# Patient Record
Sex: FEMALE | Race: BLACK OR AFRICAN AMERICAN | NOT HISPANIC OR LATINO | Employment: FULL TIME | URBAN - METROPOLITAN AREA
[De-identification: names, ages, dates, MRNs, and addresses within clinical notes are randomized per-mention and may not be internally consistent; named-entity substitution may affect disease eponyms.]

---

## 2024-08-28 ENCOUNTER — OFFICE VISIT (OUTPATIENT)
Dept: OBGYN CLINIC | Facility: CLINIC | Age: 24
End: 2024-08-28

## 2024-08-28 VITALS
HEART RATE: 86 BPM | SYSTOLIC BLOOD PRESSURE: 133 MMHG | DIASTOLIC BLOOD PRESSURE: 88 MMHG | HEIGHT: 63 IN | WEIGHT: 127 LBS | RESPIRATION RATE: 18 BRPM | BODY MASS INDEX: 22.5 KG/M2

## 2024-08-28 DIAGNOSIS — R82.90 ABNORMAL URINE ODOR: Primary | ICD-10-CM

## 2024-08-28 DIAGNOSIS — Z11.3 SCREENING FOR STD (SEXUALLY TRANSMITTED DISEASE): ICD-10-CM

## 2024-08-28 LAB
SL AMB  POCT GLUCOSE, UA: NORMAL
SL AMB LEUKOCYTE ESTERASE,UA: NORMAL
SL AMB POCT BILIRUBIN,UA: NORMAL
SL AMB POCT BLOOD,UA: NORMAL
SL AMB POCT CLARITY,UA: CLEAR
SL AMB POCT COLOR,UA: YELLOW
SL AMB POCT KETONES,UA: NORMAL
SL AMB POCT NITRITE,UA: NORMAL
SL AMB POCT PH,UA: 6
SL AMB POCT SPECIFIC GRAVITY,UA: 1.02
SL AMB POCT URINE PROTEIN: NORMAL
SL AMB POCT UROBILINOGEN: NORMAL

## 2024-08-28 PROCEDURE — 87491 CHLMYD TRACH DNA AMP PROBE: CPT | Performed by: OBSTETRICS & GYNECOLOGY

## 2024-08-28 PROCEDURE — 81003 URINALYSIS AUTO W/O SCOPE: CPT | Performed by: OBSTETRICS & GYNECOLOGY

## 2024-08-28 PROCEDURE — 87591 N.GONORRHOEAE DNA AMP PROB: CPT | Performed by: OBSTETRICS & GYNECOLOGY

## 2024-08-28 PROCEDURE — 99213 OFFICE O/P EST LOW 20 MIN: CPT | Performed by: OBSTETRICS & GYNECOLOGY

## 2024-08-28 RX ORDER — METRONIDAZOLE 500 MG/1
500 TABLET ORAL EVERY 12 HOURS SCHEDULED
Qty: 14 TABLET | Refills: 0 | Status: SHIPPED | OUTPATIENT
Start: 2024-08-28 | End: 2024-09-04

## 2024-08-28 NOTE — ASSESSMENT & PLAN NOTE
Pleasant 24 Year old  here for vaginal complaints.She complains of having foul odored discharge since 2 weeks  She denies any treatments tried. Denies recent antibiotic use. Denies douching. Denies fever, pelvic pain, burning urine, hematuria or dyspareunia. Denies new sexual partners.  She reports having a positive past history of HSV, Gonorrhea and Chlamydia  She is sexually active with 1 male partner since a long time  Her periods are normal, she does not use any birth control  Her last treatment for BV was a year ago, she states her foul odor increases after sexual activity  On exam, think discharge visible, with a foul odor. Other Exam WNL.    Plan:  Wet mount: positive whiff test. STD testing done.  POCT urine dip WNL  Order Falgyl 500mg q 12 hourly for 7 days  Discussed in detail about vaginal hygiene  Avoid sexual activity and use of soaps, tampons, contraceptive devices such as condoms, fastidious cleaning, tight clothing while being on treatment  Follow up as needed

## 2024-08-28 NOTE — PROGRESS NOTES
Ambulatory Visit  Name: Lilian Moore      : 2000      MRN: [id number]  Encounter Provider: Resident PA Abreu  Encounter Date: 2024   [facility]    Assessment & Plan   1. Abnormal vaginal discharge odor  Assessment & Plan:  Pleasant 24 Year old  here for vaginal complaints.She complains of having foul odored discharge since 2 weeks  She denies any treatments tried. Denies recent antibiotic use. Denies douching. Denies fever, pelvic pain, burning urine, hematuria or dyspareunia. Denies new sexual partners.  She reports having a positive past history of HSV, Gonorrhea and Chlamydia  She is sexually active with 1 male partner since a long time  Her periods are normal, she does not use any birth control  Her last treatment for BV was a year ago, she states her foul odor increases after sexual activity  On exam, think discharge visible, with a foul odor. Other Exam WNL.    Plan:  Wet mount: positive whiff test. STD testing done.  POCT urine dip WNL  Order Falgyl 500mg q 12 hourly for 7 days  Discussed in detail about vaginal hygiene  Avoid sexual activity and use of soaps, tampons, contraceptive devices such as condoms, fastidious cleaning, tight clothing while being on treatment  Follow up as needed  Orders:  -     POCT urine dip auto non-scope  -     metroNIDAZOLE (FLAGYL) 500 mg tablet; Take 1 tablet (500 mg total) by mouth every 12 (twelve) hours for 7 days  2. Screening for STD (sexually transmitted disease)  -     Chlamydia/GC amplified DNA by PCR      History of Present Illness     Lilian Moore is a 24 y.o. female who presents to the office with complaints of vaginal discharge since 2 weeks.    Review of Systems   Constitutional:  Negative for chills and fever.   HENT:  Negative for ear pain and sore throat.    Eyes:  Negative for pain and visual disturbance.   Respiratory:  Negative for cough and shortness of breath.   "  Cardiovascular:  Negative for chest pain and palpitations.   Gastrointestinal:  Negative for abdominal pain and vomiting.   Genitourinary:  Negative for dysuria and hematuria.        Vaginal discharge since 2 weeks   Musculoskeletal:  Negative for arthralgias and back pain.   Skin:  Negative for color change and rash.   Neurological:  Negative for seizures and syncope.   All other systems reviewed and are negative.      Objective     /88 (BP Location: Right arm, Patient Position: Sitting, Cuff Size: Adult)   Pulse 86   Resp 18   Ht 5' 3\" (1.6 m)   Wt 57.6 kg (127 lb)   LMP 08/20/2024 (Approximate)   BMI 22.50 kg/m²     Physical Exam  Vitals and nursing note reviewed.   Constitutional:       General: She is not in acute distress.     Appearance: She is well-developed.   HENT:      Head: Normocephalic and atraumatic.   Eyes:      Conjunctiva/sclera: Conjunctivae normal.   Cardiovascular:      Rate and Rhythm: Normal rate and regular rhythm.      Heart sounds: No murmur heard.  Pulmonary:      Effort: Pulmonary effort is normal. No respiratory distress.      Breath sounds: Normal breath sounds.   Abdominal:      Palpations: Abdomen is soft.      Tenderness: There is no abdominal tenderness.   Genitourinary:     General: Normal vulva.      Vagina: Vaginal discharge present.   Musculoskeletal:         General: No swelling.      Cervical back: Neck supple.   Skin:     General: Skin is warm and dry.      Capillary Refill: Capillary refill takes less than 2 seconds.   Neurological:      Mental Status: She is alert and oriented to person, place, and time.   Psychiatric:         Mood and Affect: Mood normal.       Adam Rodrigues MD  PGY2 Family Medicine Residency Program  [facility]       "

## 2024-08-29 LAB
C TRACH DNA SPEC QL NAA+PROBE: NEGATIVE
N GONORRHOEA DNA SPEC QL NAA+PROBE: NEGATIVE

## 2024-10-16 ENCOUNTER — HOSPITAL ENCOUNTER (EMERGENCY)
Facility: HOSPITAL | Age: 24
Discharge: HOME | End: 2024-10-16
Attending: EMERGENCY MEDICINE
Payer: COMMERCIAL

## 2024-10-16 VITALS
HEART RATE: 74 BPM | SYSTOLIC BLOOD PRESSURE: 135 MMHG | TEMPERATURE: 99.2 F | DIASTOLIC BLOOD PRESSURE: 78 MMHG | OXYGEN SATURATION: 99 % | RESPIRATION RATE: 19 BRPM

## 2024-10-16 DIAGNOSIS — J02.9 SORE THROAT: ICD-10-CM

## 2024-10-16 DIAGNOSIS — J06.9 VIRAL URI WITH COUGH: Primary | ICD-10-CM

## 2024-10-16 LAB
FLUAV AG UPPER RESP QL IA.RAPID: NEGATIVE
FLUBV AG UPPER RESP QL IA.RAPID: NEGATIVE
S PYO DNA THROAT QL NAA+PROBE: NOT DETECTED
SARS-COV+SARS-COV-2 AG RESP QL IA.RAPID: NEGATIVE

## 2024-10-16 PROCEDURE — 99284 EMERGENCY DEPT VISIT MOD MDM: CPT | Performed by: PHYSICIAN ASSISTANT

## 2024-10-16 PROCEDURE — 87651 STREP A DNA AMP PROBE: CPT | Performed by: PHYSICIAN ASSISTANT

## 2024-10-16 PROCEDURE — 87811 SARS-COV-2 COVID19 W/OPTIC: CPT | Performed by: PHYSICIAN ASSISTANT

## 2024-10-16 PROCEDURE — 99283 EMERGENCY DEPT VISIT LOW MDM: CPT

## 2024-10-16 PROCEDURE — 87804 INFLUENZA ASSAY W/OPTIC: CPT | Performed by: PHYSICIAN ASSISTANT

## 2024-10-16 RX ORDER — CROMOLYN SODIUM 5.2 MG
1 AEROSOL, SPRAY WITH PUMP (ML) NASAL 4 TIMES DAILY PRN
Qty: 413 ML | Refills: 0 | Status: SHIPPED | OUTPATIENT
Start: 2024-10-16

## 2024-10-16 NOTE — ED PROVIDER NOTES
"Time reflects when diagnosis was documented in both MDM as applicable and the Disposition within this note       Time User Action Codes Description Comment    10/16/2024 11:17 AM Justin Melissa Add [J06.9] Viral URI with cough     10/16/2024 11:17 AM Justin Melissa Add [J02.9] Sore throat           ED Disposition       ED Disposition   Discharge    Condition   Stable    Date/Time   Wed Oct 16, 2024 11:17 AM    Comment   Lilian Moore discharge to home/self care.                   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  Sent in by facility for COVID testing.  It hurts swallow but she is able she has a normal voice she is nontoxic no acute distress differential diagnose includes not limited to COVID, flu, strep, viral syndrome.    Amount and/or Complexity of Data Reviewed  Labs: ordered.             Medications - No data to display    ED Risk Strat Scores                           SBIRT 20yo+      Flowsheet Row Most Recent Value   Initial Alcohol Screen: US AUDIT-C     1. How often do you have a drink containing alcohol? 0 Filed at: 10/16/2024 1111   2. How many drinks containing alcohol do you have on a typical day you are drinking?  0 Filed at: 10/16/2024 1111   3a. Male UNDER 65: How often do you have five or more drinks on one occasion? 0 Filed at: 10/16/2024 1111   3b. FEMALE Any Age, or MALE 65+: How often do you have 4 or more drinks on one occassion? 0 Filed at: 10/16/2024 1111   Audit-C Score 0 Filed at: 10/16/2024 1111   RENE: How many times in the past year have you...    Used an illegal drug or used a prescription medication for non-medical reasons? Never Filed at: 10/16/2024 1111                            History of Present Illness       Chief Complaint   Patient presents with    Cold Like Symptoms     Pt. 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 Pt. Reports no body aches or chills.        Past Medical History:   Diagnosis Date    Acne     Allergic     Asthma     Migraines       Past Surgical History:   Procedure Laterality Date    INDUCED         Family History   Problem Relation Age of Onset    Depression Mother     Anxiety disorder Mother     Bipolar disorder Mother     Diabetes Mother     Hypertension Mother     Hypertension Father     Sleep apnea Father     Pancreatic cancer Maternal Grandmother     Diabetes Maternal Grandmother     Hypertension Maternal Grandmother     Diabetes Maternal Grandfather     Pancreatic cancer Maternal Grandfather     Diabetes Paternal Grandmother     Diabetes Paternal Grandfather     Diabetes Maternal Aunt     Diabetes Maternal Uncle     Diabetes Paternal Aunt     Diabetes Paternal Uncle     No Known Problems Half-Sister     No Known Problems Half-Sister     No Known Problems Half-Sister     No Known Problems Half-Sister     No Known Problems Half-Brother     No Known Problems Half-Brother       Social History     Tobacco Use    Smoking status: Former     Current packs/day: 0.00     Types: Cigarettes     Quit date:      Years since quittin.7    Smokeless tobacco: Never    Tobacco comments:     quit    Vaping Use    Vaping status: Never Used   Substance Use Topics    Alcohol use: Not Currently     Comment: social    Drug use: Not Currently     Frequency: 3.0 times per week     Types: Marijuana      E-Cigarette/Vaping    E-Cigarette Use Never User       E-Cigarette/Vaping Substances    Nicotine No     THC No     CBD No     Flavoring No     Other No     Unknown No       I have reviewed and agree with the history as documented.     This is a 24-year-old female patient started 3 days ago with nasal congestion and mild cough and a sore throat able to drink eat has a normal voice no stiff neck.  Patient reports no " chills no fevers no bodyaches.  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  No headache no blurred vision no double vision no shortness of breath no chest pain no nausea vomiting diarrhea abdominal pain she taken over-the-counter medication with to help the symptoms but does not overall control the problem.  She is nontoxic no acute distress        Review of Systems   Constitutional:  Negative for diaphoresis, fatigue and fever.   HENT:  Positive for congestion, rhinorrhea and sore throat. Negative for dental problem, drooling, ear discharge, ear pain, facial swelling, mouth sores, nosebleeds, postnasal drip, sinus pressure, sinus pain, sneezing, tinnitus, trouble swallowing and voice change.    Eyes:  Negative for photophobia, pain, discharge and visual disturbance.   Respiratory:  Positive for cough. Negative for choking, chest tightness, shortness of breath and wheezing.    Cardiovascular:  Negative for chest pain and palpitations.   Gastrointestinal:  Negative for abdominal distention, abdominal pain, diarrhea and vomiting.   Genitourinary:  Negative for dysuria, flank pain and frequency.   Musculoskeletal:  Negative for back pain, gait problem and joint swelling.   Skin:  Negative for color change and rash.   Neurological:  Negative for dizziness, syncope and headaches.   Psychiatric/Behavioral:  Negative for behavioral problems and confusion. The patient is not nervous/anxious.    All other systems reviewed and are negative.          Objective       ED Triage Vitals [10/16/24 1110]   Temperature Pulse Blood Pressure Respirations SpO2 Patient Position - Orthostatic VS   99.2 °F (37.3 °C) 74 135/78 19 99 % Sitting      Temp Source Heart Rate Source BP Location FiO2 (%) Pain Score    Oral Monitor Right arm -- No Pain      Vitals      Date and Time Temp Pulse SpO2 Resp BP Pain Score FACES Pain Rating User   10/16/24 1110 99.2 °F (37.3 °C) 74 99 % 19 135/78 No  Pain -- DD            Physical Exam  Vitals and nursing note reviewed.   Constitutional:       General: She is not in acute distress.     Appearance: Normal appearance. She is not ill-appearing, toxic-appearing or diaphoretic.   HENT:      Head: Normocephalic and atraumatic.      Right Ear: Tympanic membrane, ear canal and external ear normal.      Left Ear: Tympanic membrane, ear canal and external ear normal.      Nose: Nose normal. No congestion or rhinorrhea.      Mouth/Throat:      Mouth: Mucous membranes are dry.      Pharynx: Oropharynx is clear. No oropharyngeal exudate or posterior oropharyngeal erythema.   Eyes:      Extraocular Movements: Extraocular movements intact.      Conjunctiva/sclera: Conjunctivae normal.      Pupils: Pupils are equal, round, and reactive to light.   Cardiovascular:      Rate and Rhythm: Normal rate and regular rhythm.   Pulmonary:      Effort: Pulmonary effort is normal. No respiratory distress.      Breath sounds: Normal breath sounds.   Abdominal:      General: Bowel sounds are normal.      Palpations: Abdomen is soft.      Tenderness: There is no abdominal tenderness.   Musculoskeletal:         General: Normal range of motion.      Cervical back: Normal range of motion and neck supple. No rigidity or tenderness.      Right lower leg: No edema.      Left lower leg: No edema.   Lymphadenopathy:      Cervical: No cervical adenopathy.   Skin:     General: Skin is warm and dry.      Capillary Refill: Capillary refill takes less than 2 seconds.      Findings: No rash.   Neurological:      General: No focal deficit present.      Mental Status: She is alert and oriented to person, place, and time. Mental status is at baseline.   Psychiatric:         Mood and Affect: Mood normal.         Behavior: Behavior normal.         Results Reviewed       Procedure Component Value Units Date/Time    FLU/COVID Rapid Antigen (30 min. TAT) - Preferred screening test in ED [[id number]] Collected:  10/16/24 1114    Lab Status: In process Specimen: Nares from Nose Updated: 10/16/24 1117    Strep A PCR [[id number]] Collected: 10/16/24 1114    Lab Status: In process Specimen: Throat Updated: 10/16/24 1117            No orders to display       Procedures    ED Medication and Procedure Management   Prior to Admission Medications   Prescriptions Last Dose Informant Patient Reported? Taking?   3 Day Vaginal 2 % vaginal cream  Self Yes No   Sig: insert 1 applicatorful vaginally at bedtime for 3 days   Patient not taking: Reported on 2023   Biotin 1 MG CAPS  Self Yes No   Sig: Take by mouth   Patient not taking: Reported on 2022   Cholecalciferol 71213 units capsule  Self No No   Sig: Take 1 capsule (50,000 Units total) by mouth once a week x12wks and then to take OTC Vit D 2000IU QD   Patient not taking: Reported on 2020   Prenatal MV-Min-Fe Fum-FA-DHA (PRENATAL 1 PO)  Self Yes No   Sig: Take by mouth   Patient not taking: Reported on 2024   acetaminophen (TYLENOL) 500 mg tablet  Self Yes No   Sig: Take 500 mg by mouth every 6 (six) hours as needed for mild pain   Patient not taking: Reported on 2023   aspirin (ECOTRIN LOW STRENGTH) 81 mg EC tablet   No No   Sig: Take 2 tablets (162 mg total) by mouth daily Stop at 36 weeks.   Patient not taking: Reported on 2023   clindamycin (CLEOCIN T) 1 % lotion  Self Yes No   Sig: APPLY TO ACNE AREAS ON FACE TWICE A DAY   Patient not taking: Reported on 2022   ferrous sulfate 325 (65 Fe) mg tablet  Self Yes No   Sig: Take 325 mg by mouth daily with breakfast   fluconazole (DIFLUCAN) 100 mg tablet  Self Yes No   Sig: Take 100 mg by mouth if needed   Patient not taking: Reported on 2023   fluticasone (FLONASE) 50 mcg/act nasal spray  Self No No   Si spray into each nostril daily   Patient not taking: Reported on 2022   triamcinolone (KENALOG) 0.1 % cream  Self Yes No   Sig: APPLY A SMALL AMOUNT TO RASH ON FACE TWICE DAILY FOR UP TO 2  WEEKS THEN AS NEEDED FOR FLARE UPS   Patient not taking: Reported on 8/17/2022   valACYclovir (VALTREX) 1,000 mg tablet   No No   Sig: Take 1 tablet (1,000 mg total) by mouth 2 (two) times a day for 10 days   Patient not taking: Reported on 12/20/2022      Facility-Administered Medications: None     Patient's Medications   Discharge Prescriptions    CROMOLYN (NASALCHROM) 5.2 MG/ACT NASAL SPRAY    1 spray into each nostril 4 (four) times a day as needed for rhinitis       Start Date: 10/16/2024End Date: --       Order Dose: 1 spray       Quantity: 413 mL    Refills: 0     No discharge procedures on file.  ED SEPSIS DOCUMENTATION   Time reflects when diagnosis was documented in both MDM as applicable and the Disposition within this note       Time User Action Codes Description Comment    10/16/2024 11:17 AM Justin Raygoza [J06.9] Viral URI with cough     10/16/2024 11:17 AM uJstin Raygoza [J02.9] Sore throat                  Justin Raygoza PA-C  10/16/24 1123

## 2024-10-16 NOTE — Clinical Note
Lilian Moore was seen and treated in our emergency department on 10/16/2024.                Diagnosis: Viral symptoms    Lilian  .    She may return on this date: 10/17/2024    You may return tomorrow if your COVID and flu are negative.  If your COVID is positive please follow the CDC protocol/and/or your work protocol for return     If you have any questions or concerns, please don't hesitate to call.      Justin Raygoza PA-C    ______________________________           _______________          _______________  Hospital Representative                              Date                                Time

## 2024-10-16 NOTE — DISCHARGE INSTRUCTIONS
Return with any worsening symptoms questions comments or concerns    Follow-up with your family doctor for ongoing care on re-evaluation    We will call you if any of your testing today is positive you can also look on [facility]. PHILLIP

## 2024-12-20 ENCOUNTER — HOSPITAL ENCOUNTER (EMERGENCY)
Facility: HOSPITAL | Age: 24
Discharge: HOME | End: 2024-12-20
Attending: EMERGENCY MEDICINE
Payer: COMMERCIAL

## 2024-12-20 ENCOUNTER — APPOINTMENT (EMERGENCY)
Dept: RADIOLOGY | Facility: HOSPITAL | Age: 24
End: 2024-12-20
Payer: COMMERCIAL

## 2024-12-20 VITALS
WEIGHT: 130 LBS | HEIGHT: 63 IN | SYSTOLIC BLOOD PRESSURE: 137 MMHG | BODY MASS INDEX: 23.04 KG/M2 | TEMPERATURE: 98.4 F | DIASTOLIC BLOOD PRESSURE: 88 MMHG | OXYGEN SATURATION: 100 % | HEART RATE: 95 BPM | RESPIRATION RATE: 18 BRPM

## 2024-12-20 DIAGNOSIS — B34.9 VIRAL SYNDROME: Primary | ICD-10-CM

## 2024-12-20 LAB
EXT PREGNANCY TEST URINE: NEGATIVE
EXT. CONTROL: NORMAL
FLUAV AG UPPER RESP QL IA.RAPID: NEGATIVE
FLUBV AG UPPER RESP QL IA.RAPID: NEGATIVE
S PYO DNA THROAT QL NAA+PROBE: NOT DETECTED
SARS-COV+SARS-COV-2 AG RESP QL IA.RAPID: NEGATIVE

## 2024-12-20 PROCEDURE — 81025 URINE PREGNANCY TEST: CPT | Performed by: EMERGENCY MEDICINE

## 2024-12-20 PROCEDURE — 87811 SARS-COV-2 COVID19 W/OPTIC: CPT | Performed by: EMERGENCY MEDICINE

## 2024-12-20 PROCEDURE — 71046 X-RAY EXAM CHEST 2 VIEWS: CPT

## 2024-12-20 PROCEDURE — 99284 EMERGENCY DEPT VISIT MOD MDM: CPT | Performed by: EMERGENCY MEDICINE

## 2024-12-20 PROCEDURE — 87651 STREP A DNA AMP PROBE: CPT | Performed by: EMERGENCY MEDICINE

## 2024-12-20 PROCEDURE — 99283 EMERGENCY DEPT VISIT LOW MDM: CPT

## 2024-12-20 PROCEDURE — 87804 INFLUENZA ASSAY W/OPTIC: CPT | Performed by: EMERGENCY MEDICINE

## 2024-12-20 RX ORDER — ACETAMINOPHEN 325 MG/1
975 TABLET ORAL ONCE
Status: COMPLETED | OUTPATIENT
Start: 2024-12-20 | End: 2024-12-20

## 2024-12-20 RX ORDER — IBUPROFEN 400 MG/1
400 TABLET, FILM COATED ORAL ONCE
Status: COMPLETED | OUTPATIENT
Start: 2024-12-20 | End: 2024-12-20

## 2024-12-20 RX ORDER — FLUTICASONE PROPIONATE 50 MCG
1 SPRAY, SUSPENSION (ML) NASAL DAILY
Status: DISCONTINUED | OUTPATIENT
Start: 2024-12-20 | End: 2024-12-20 | Stop reason: HOSPADM

## 2024-12-20 RX ADMIN — FLUTICASONE PROPIONATE 1 SPRAY: 50 SPRAY, METERED NASAL at 09:43

## 2024-12-20 RX ADMIN — ACETAMINOPHEN 975 MG: 325 TABLET ORAL at 09:42

## 2024-12-20 RX ADMIN — IBUPROFEN 400 MG: 400 TABLET, FILM COATED ORAL at 09:42

## 2024-12-20 NOTE — ED PROVIDER NOTES
Time reflects when diagnosis was documented in both MDM as applicable and the Disposition within this note       Time User Action Codes Description Comment    12/20/2024 10:27 AM Anne-Marie Hussein Add [B34.9] Viral syndrome           ED Disposition       ED Disposition   Discharge    Condition   Stable    Date/Time   Fri Dec 20, 2024 10:27 AM    Comment   Lilian Moore discharge to home/self care.                   Assessment & Plan       Medical Decision Making  Pt is a 25yo F who presents with cough and myalgias. Exam pertinent for well-appearing patient.    Differential diagnosis to include but not limited to viral syndrome, pharyngitis, pneumonia.  Will plan for viral swab, strep swab, chest x-ray, and pregnancy test.  See ED course for results and details.    Plan to discharge pt with f/u to PCP. Discussed returning the ED with new or worsening of symptoms. Discussed use of over the counter medications as stated on the bottle as needed for symptoms. Pt expressed understanding of discharge instructions, return precautions, and medication instructions and is stable for discharge at this time. All questions were answered and pt was discharged without incident.         Amount and/or Complexity of Data Reviewed  Labs: ordered. Decision-making details documented in ED Course.  Radiology: ordered. Decision-making details documented in ED Course.    Risk  OTC drugs.  Prescription drug management.        ED Course as of 12/20/24 1032   Fri Dec 20, 2024   0953 PREGNANCY TEST URINE: Negative  Negative.    1014 FLU/COVID Rapid Antigen (30 min. TAT) - Preferred screening test in ED  Negative.    1014 XR chest 2 views  No acute findings on wet read.    1015 Pulse: 95  Interval improvement.    1017 STREP A PCR: Not Detected  Negative.        Medications   fluticasone (FLONASE) 50 mcg/act nasal spray 1 spray (1 spray Each Nare Given 12/20/24 0943)   acetaminophen (TYLENOL) tablet 975 mg (975 mg Oral Given 12/20/24 0942)    ibuprofen (MOTRIN) tablet 400 mg (400 mg Oral Given 24 0942)       ED Risk Strat Scores                          SBIRT 20yo+      Flowsheet Row Most Recent Value   Initial Alcohol Screen: US AUDIT-C     1. How often do you have a drink containing alcohol? 0 Filed at: 2024   2. How many drinks containing alcohol do you have on a typical day you are drinking?  0 Filed at: 2024   3a. Male UNDER 65: How often do you have five or more drinks on one occasion? 0 Filed at: 2024   3b. FEMALE Any Age, or MALE 65+: How often do you have 4 or more drinks on one occassion? 0 Filed at: 2024   Audit-C Score 0 Filed at: 2024   RENE: How many times in the past year have you...    Used an illegal drug or used a prescription medication for non-medical reasons? Never Filed at: 2024                            History of Present Illness       Chief Complaint   Patient presents with    Flu Symptoms     Congestion, chills, body aches, HA, sore throat for 3 days       Past Medical History:   Diagnosis Date    Acne     Allergic     Asthma     Migraines       Past Surgical History:   Procedure Laterality Date    INDUCED         Family History   Problem Relation Age of Onset    Depression Mother     Anxiety disorder Mother     Bipolar disorder Mother     Diabetes Mother     Hypertension Mother     Hypertension Father     Sleep apnea Father     Pancreatic cancer Maternal Grandmother     Diabetes Maternal Grandmother     Hypertension Maternal Grandmother     Diabetes Maternal Grandfather     Pancreatic cancer Maternal Grandfather     Diabetes Paternal Grandmother     Diabetes Paternal Grandfather     Diabetes Maternal Aunt     Diabetes Maternal Uncle     Diabetes Paternal Aunt     Diabetes Paternal Uncle     No Known Problems Half-Sister     No Known Problems Half-Sister     No Known Problems Half-Sister     No Known Problems Half-Sister     No Known Problems  Half-Brother     No Known Problems Half-Brother       Social History     Tobacco Use    Smoking status: Former     Current packs/day: 0.00     Types: Cigarettes     Quit date:      Years since quittin.9    Smokeless tobacco: Never    Tobacco comments:     quit 2018   Vaping Use    Vaping status: Never Used   Substance Use Topics    Alcohol use: Not Currently     Comment: social    Drug use: Not Currently     Frequency: 3.0 times per week     Types: Marijuana      E-Cigarette/Vaping    E-Cigarette Use Never User       E-Cigarette/Vaping Substances    Nicotine No     THC No     CBD No     Flavoring No     Other No     Unknown No       I have reviewed and agree with the history as documented.     Pt is a 25yo F who presents for sore throat and myalgias.  Patient reports her symptoms started 3 days ago.  Patient reports diffuse myalgias.  She reports bilateral sore throat.  She states it is painful to swallow but she has been able to tolerate p.o.  Patient also reports that she feels dehydrated.  Patient also reporting a cough that is productive.  She states that she has not checked her temperature and is unsure if she has had a fever.  Patient reports that her daughter is in  and therefore often ill but denies any other sick contacts.  Patient reports she is otherwise healthy.  Patient states she does not believe she is pregnant.  Patient reports she has been intermittently using Mucinex and Tylenol for symptoms.            Objective       ED Triage Vitals [24]   Temperature Pulse Blood Pressure Respirations SpO2 Patient Position - Orthostatic VS   98.4 °F (36.9 °C) (!) 135 149/95 18 98 % --      Temp src Heart Rate Source BP Location FiO2 (%) Pain Score    -- -- -- -- 5      Vitals      Date and Time Temp Pulse SpO2 Resp BP Pain Score FACES Pain Rating User   24 0945 -- 95 100 % 18 137/88 -- -- AYAZ   24 0942 -- -- -- -- -- 4 -- AYAZ   24 98.4 °F (36.9 °C) 135 98 % 18  149/95 5 -- YAAZ            Physical Exam  Vitals reviewed.   Constitutional:       General: She is not in acute distress.     Appearance: She is well-developed. She is not toxic-appearing or diaphoretic.   HENT:      Head: Normocephalic and atraumatic.      Right Ear: External ear normal.      Left Ear: External ear normal.      Nose: Nose normal.      Mouth/Throat:      Pharynx: Oropharynx is clear.   Eyes:      Extraocular Movements: Extraocular movements intact.      Conjunctiva/sclera: Conjunctivae normal.      Pupils: Pupils are equal, round, and reactive to light.   Cardiovascular:      Rate and Rhythm: Regular rhythm. Tachycardia present.      Heart sounds: Normal heart sounds.   Pulmonary:      Effort: Pulmonary effort is normal. No respiratory distress.      Breath sounds: Normal breath sounds. No wheezing.   Abdominal:      General: There is no distension.      Palpations: Abdomen is soft.      Tenderness: There is no abdominal tenderness.   Musculoskeletal:         General: Normal range of motion.      Cervical back: Normal range of motion and neck supple.      Right lower leg: No edema.      Left lower leg: No edema.   Skin:     General: Skin is warm and dry.      Capillary Refill: Capillary refill takes less than 2 seconds.      Coloration: Skin is not pale.      Findings: No erythema or rash.   Neurological:      General: No focal deficit present.      Mental Status: She is alert and oriented to person, place, and time.   Psychiatric:         Speech: Speech normal.         Behavior: Behavior is cooperative.         Results Reviewed       Procedure Component Value Units Date/Time    Strep A PCR [[id number]]  (Normal) Collected: 12/20/24 0942    Lab Status: Final result Specimen: Throat Updated: 12/20/24 1016     STREP A PCR Not Detected    FLU/COVID Rapid Antigen (30 min. TAT) - Preferred screening test in ED [[id number]]  (Normal) Collected: 12/20/24 0942    Lab Status: Final result Specimen: Nares from  Nose Updated: 12/20/24 1011     SARS COV Rapid Antigen Negative     Influenza A Rapid Antigen Negative     Influenza B Rapid Antigen Negative    Narrative:      This test has been performed using the Quidel Monserrat 2 FLU+SARS Antigen test under the Emergency Use Authorization (EUA). This test has been validated by the  and verified by the performing laboratory. The Monserrat uses lateral flow immunofluorescent sandwich assay to detect SARS-COV, Influenza A and Influenza B Antigen.     The Quidel Monserrat 2 SARS Antigen test does not differentiate between SARS-CoV and SARS-CoV-2.     Negative results are presumptive and may be confirmed with a molecular assay, if necessary, for patient management. Negative results do not rule out SARS-CoV-2 or influenza infection and should not be used as the sole basis for treatment or patient management decisions. A negative test result may occur if the level of antigen in a sample is below the limit of detection of this test.     Positive results are indicative of the presence of viral antigens, but do not rule out bacterial infection or co-infection with other viruses.     All test results should be used as an adjunct to clinical observations and other information available to the provider.    FOR PEDIATRIC PATIENTS - copy/paste COVID Guidelines URL to browser: [email or web address]    POCT pregnancy, urine [[id number]]  (Normal) Collected: 12/20/24 0952    Lab Status: Final result Updated: 12/20/24 0952     EXT Preg Test, Ur Negative     Control Valid            XR chest 2 views    (Results Pending)       Procedures    ED Medication and Procedure Management   Prior to Admission Medications   Prescriptions Last Dose Informant Patient Reported? Taking?   3 Day Vaginal 2 % vaginal cream  Self Yes No   Sig: insert 1 applicatorful vaginally at bedtime for 3 days   Patient not taking: Reported on 2/9/2023   Biotin 1 MG CAPS  Self  Yes No   Sig: Take by mouth   Patient not taking: Reported on 2022   Cholecalciferol 96592 units capsule  Self No No   Sig: Take 1 capsule (50,000 Units total) by mouth once a week x12wks and then to take OTC Vit D 2000IU QD   Patient not taking: Reported on 2020   Prenatal MV-Min-Fe Fum-FA-DHA (PRENATAL 1 PO)  Self Yes No   Sig: Take by mouth   Patient not taking: Reported on 2024   acetaminophen (TYLENOL) 500 mg tablet  Self Yes No   Sig: Take 500 mg by mouth every 6 (six) hours as needed for mild pain   Patient not taking: Reported on 2023   aspirin (ECOTRIN LOW STRENGTH) 81 mg EC tablet   No No   Sig: Take 2 tablets (162 mg total) by mouth daily Stop at 36 weeks.   Patient not taking: Reported on 2023   clindamycin (CLEOCIN T) 1 % lotion  Self Yes No   Sig: APPLY TO ACNE AREAS ON FACE TWICE A DAY   Patient not taking: Reported on 2022   cromolyn (NASALCHROM) 5.2 MG/ACT nasal spray   No No   Si spray into each nostril 4 (four) times a day as needed for rhinitis   ferrous sulfate 325 (65 Fe) mg tablet  Self Yes No   Sig: Take 325 mg by mouth daily with breakfast   fluconazole (DIFLUCAN) 100 mg tablet  Self Yes No   Sig: Take 100 mg by mouth if needed   Patient not taking: Reported on 2023   fluticasone (FLONASE) 50 mcg/act nasal spray  Self No No   Si spray into each nostril daily   Patient not taking: Reported on 2022   triamcinolone (KENALOG) 0.1 % cream  Self Yes No   Sig: APPLY A SMALL AMOUNT TO RASH ON FACE TWICE DAILY FOR UP TO 2 WEEKS THEN AS NEEDED FOR FLARE UPS   Patient not taking: Reported on 2022   valACYclovir (VALTREX) 1,000 mg tablet   No No   Sig: Take 1 tablet (1,000 mg total) by mouth 2 (two) times a day for 10 days   Patient not taking: Reported on 2022      Facility-Administered Medications: None     Patient's Medications   Discharge Prescriptions    No medications on file     No discharge procedures on file.  ED SEPSIS DOCUMENTATION    Time reflects when diagnosis was documented in both MDM as applicable and the Disposition within this note       Time User Action Codes Description Comment    12/20/2024 10:27 AM Anne-Marie Hussein Add [B34.9] Viral syndrome                  Anne-Marie Hussein MD  12/20/24 1032

## 2024-12-20 NOTE — DISCHARGE INSTRUCTIONS
Follow-up with primary care for further care. Contact info provided below if needed.  Use over the counter medications as stated on the bottle as needed for symptom control.  Return to the ED with new or worsening symptoms.

## 2025-01-11 ENCOUNTER — HOSPITAL ENCOUNTER (EMERGENCY)
Facility: HOSPITAL | Age: 25
Discharge: HOME | End: 2025-01-11
Attending: EMERGENCY MEDICINE
Payer: COMMERCIAL

## 2025-01-11 VITALS
DIASTOLIC BLOOD PRESSURE: 75 MMHG | RESPIRATION RATE: 16 BRPM | TEMPERATURE: 99.5 F | HEART RATE: 90 BPM | SYSTOLIC BLOOD PRESSURE: 128 MMHG

## 2025-01-11 DIAGNOSIS — N76.0 BV (BACTERIAL VAGINOSIS): Primary | ICD-10-CM

## 2025-01-11 DIAGNOSIS — B96.89 BV (BACTERIAL VAGINOSIS): Primary | ICD-10-CM

## 2025-01-11 LAB
BACTERIA UR QL AUTO: NORMAL /HPF
BILIRUB UR QL STRIP: NEGATIVE
CLARITY UR: CLEAR
COLOR UR: COLORLESS
EXT PREGNANCY TEST URINE: NEGATIVE
EXT. CONTROL: NORMAL
GLUCOSE UR STRIP-MCNC: NEGATIVE MG/DL
HGB UR QL STRIP.AUTO: NEGATIVE
KETONES UR STRIP-MCNC: NEGATIVE MG/DL
LEUKOCYTE ESTERASE UR QL STRIP: ABNORMAL
NITRITE UR QL STRIP: NEGATIVE
NON-SQ EPI CELLS URNS QL MICRO: NORMAL /HPF
PH UR STRIP.AUTO: 6 [PH]
PROT UR STRIP-MCNC: NEGATIVE MG/DL
RBC #/AREA URNS AUTO: NORMAL /HPF
SP GR UR STRIP.AUTO: <1.005 (ref 1–1.03)
UROBILINOGEN UR STRIP-ACNC: <2 MG/DL
WBC #/AREA URNS AUTO: NORMAL /HPF

## 2025-01-11 PROCEDURE — 87591 N.GONORRHOEAE DNA AMP PROB: CPT | Performed by: EMERGENCY MEDICINE

## 2025-01-11 PROCEDURE — 81001 URINALYSIS AUTO W/SCOPE: CPT | Performed by: EMERGENCY MEDICINE

## 2025-01-11 PROCEDURE — 99283 EMERGENCY DEPT VISIT LOW MDM: CPT

## 2025-01-11 PROCEDURE — 81025 URINE PREGNANCY TEST: CPT | Performed by: EMERGENCY MEDICINE

## 2025-01-11 PROCEDURE — 87563 M. GENITALIUM AMP PROBE: CPT | Performed by: EMERGENCY MEDICINE

## 2025-01-11 PROCEDURE — 87491 CHLMYD TRACH DNA AMP PROBE: CPT | Performed by: EMERGENCY MEDICINE

## 2025-01-11 PROCEDURE — 99284 EMERGENCY DEPT VISIT MOD MDM: CPT | Performed by: EMERGENCY MEDICINE

## 2025-01-11 PROCEDURE — 87661 TRICHOMONAS VAGINALIS AMPLIF: CPT | Performed by: EMERGENCY MEDICINE

## 2025-01-11 RX ORDER — METRONIDAZOLE 500 MG/1
500 TABLET ORAL 2 TIMES DAILY
Qty: 14 TABLET | Refills: 0 | Status: SHIPPED | OUTPATIENT
Start: 2025-01-11 | End: 2025-01-18

## 2025-01-11 RX ORDER — METRONIDAZOLE 500 MG/1
500 TABLET ORAL ONCE
Status: COMPLETED | OUTPATIENT
Start: 2025-01-11 | End: 2025-01-11

## 2025-01-11 RX ADMIN — METRONIDAZOLE 500 MG: 500 TABLET ORAL at 16:56

## 2025-01-11 NOTE — ED PROVIDER NOTES
Time reflects when diagnosis was documented in both MDM as applicable and the Disposition within this note       Time User Action Codes Description Comment    2025  4:48 PM Cheo Johnston Add [N76.0,  B96.89] BV (bacterial vaginosis)           ED Disposition       ED Disposition   Discharge    Condition   Stable    Date/Time   Sat 2025  4:48 PM    Comment   Lilian Moore discharge to home/self care.                   Assessment & Plan       Medical Decision Making        UA not clearly suggestive of infection will go ahead and treat BV advised patient follow-up primary care physician to resume patient.    Amount and/or Complexity of Data Reviewed  Labs: ordered.    Risk  Prescription drug management.             Medications   metroNIDAZOLE (FLAGYL) tablet 500 mg (500 mg Oral Given 25 1656)       ED Risk Strat Scores                                              History of Present Illness       Chief Complaint   Patient presents with    Possible UTI     Pt states thinks she has BV and a UTI, c/o vaginal discharge and foul smell, also c/o burning and frequency with urination       Past Medical History:   Diagnosis Date    Acne     Allergic     Asthma     Migraines       Past Surgical History:   Procedure Laterality Date    INDUCED         Family History   Problem Relation Age of Onset    Depression Mother     Anxiety disorder Mother     Bipolar disorder Mother     Diabetes Mother     Hypertension Mother     Hypertension Father     Sleep apnea Father     Pancreatic cancer Maternal Grandmother     Diabetes Maternal Grandmother     Hypertension Maternal Grandmother     Diabetes Maternal Grandfather     Pancreatic cancer Maternal Grandfather     Diabetes Paternal Grandmother     Diabetes Paternal Grandfather     Diabetes Maternal Aunt     Diabetes Maternal Uncle     Diabetes Paternal Aunt     Diabetes Paternal Uncle     No Known Problems Half-Sister     No Known Problems Half-Sister     No Known  Problems Half-Sister     No Known Problems Half-Sister     No Known Problems Half-Brother     No Known Problems Half-Brother       Social History     Tobacco Use    Smoking status: Former     Current packs/day: 0.00     Types: Cigarettes     Quit date: 2022     Years since quitting: 3.0    Smokeless tobacco: Never    Tobacco comments:     quit 2018   Vaping Use    Vaping status: Never Used   Substance Use Topics    Alcohol use: Not Currently     Comment: social    Drug use: Not Currently     Frequency: 3.0 times per week     Types: Marijuana      E-Cigarette/Vaping    E-Cigarette Use Never User       E-Cigarette/Vaping Substances    Nicotine No     THC No     CBD No     Flavoring No     Other No     Unknown No       I have reviewed and agree with the history as documented.     Patient presents for evaluation for BV.  Patient states she feels that she has BV as she has a foul odor and discharge similar to prior episodes and just needs a prescription.  Was not sure she had a UTI as she has some urgency to go but no burning with urination.  Denies any belly or back pain.      History provided by:  Patient   used: No        Review of Systems   Genitourinary:  Positive for vaginal discharge.   All other systems reviewed and are negative.          Objective       ED Triage Vitals [01/11/25 1558]   Temperature Pulse Blood Pressure Respirations SpO2 Patient Position - Orthostatic VS   99.5 °F (37.5 °C) 90 128/75 16 -- Sitting      Temp Source Heart Rate Source BP Location FiO2 (%) Pain Score    Tympanic -- Right arm -- No Pain      Vitals      Date and Time Temp Pulse SpO2 Resp BP Pain Score FACES Pain Rating User   01/11/25 1558 99.5 °F (37.5 °C) 90 -- 16 128/75 No Pain -- LZ            Physical Exam  Vitals and nursing note reviewed.   Constitutional:       General: She is not in acute distress.  Cardiovascular:      Rate and Rhythm: Normal rate.   Pulmonary:      Effort: Pulmonary effort is normal. No  respiratory distress.   Abdominal:      Tenderness: There is no abdominal tenderness.   Genitourinary:     Comments: Patient deferred  Neurological:      General: No focal deficit present.      Mental Status: She is alert and oriented to person, place, and time.         Results Reviewed       Procedure Component Value Units Date/Time    UA w Reflex to Microscopic w Reflex to Culture [[id number]]  (Abnormal) Collected: 01/11/25 1605    Lab Status: Final result Specimen: Urine, Clean Catch Updated: 01/11/25 1626     Color, UA Colorless     Clarity, UA Clear     Specific Gravity, UA <1.005     pH, UA 6.0     Leukocytes, UA Trace     Nitrite, UA Negative     Protein, UA Negative mg/dl      Glucose, UA Negative mg/dl      Ketones, UA Negative mg/dl      Urobilinogen, UA <2.0 mg/dl      Bilirubin, UA Negative     Occult Blood, UA Negative    Urine Microscopic [[id number]] Collected: 01/11/25 1605    Lab Status: In process Specimen: Urine, Clean Catch Updated: 01/11/25 1626    Trichomonas vaginalis/Mycoplasma genitalium PCR [[id number]] Collected: 01/11/25 1606    Lab Status: In process Specimen: Urine, Voided Updated: 01/11/25 1623    Chlamydia/GC amplified DNA by PCR [[id number]] Collected: 01/11/25 1606    Lab Status: In process Specimen: Urine, Other Updated: 01/11/25 1623    POCT pregnancy, urine [[id number]]  (Normal) Collected: 01/11/25 1606    Lab Status: Final result Updated: 01/11/25 1606     EXT Preg Test, Ur Negative     Control Valid            No orders to display       Procedures    ED Medication and Procedure Management   Prior to Admission Medications   Prescriptions Last Dose Informant Patient Reported? Taking?   3 Day Vaginal 2 % vaginal cream Not Taking Self Yes No   Sig: insert 1 applicatorful vaginally at bedtime for 3 days   Patient not taking: Reported on 2/9/2023   Biotin 1 MG CAPS Not Taking Self Yes No   Sig: Take by mouth   Patient not taking: Reported on 11/30/2022   Cholecalciferol 14735 units  capsule Not Taking Self No No   Sig: Take 1 capsule (50,000 Units total) by mouth once a week x12wks and then to take OTC Vit D 2000IU QD   Patient not taking: Reported on 2020   Prenatal MV-Min-Fe Fum-FA-DHA (PRENATAL 1 PO) Not Taking Self Yes No   Sig: Take by mouth   Patient not taking: Reported on 2024   acetaminophen (TYLENOL) 500 mg tablet Not Taking Self Yes No   Sig: Take 500 mg by mouth every 6 (six) hours as needed for mild pain   Patient not taking: Reported on 2023   aspirin (ECOTRIN LOW STRENGTH) 81 mg EC tablet   No No   Sig: Take 2 tablets (162 mg total) by mouth daily Stop at 36 weeks.   Patient not taking: Reported on 2023   clindamycin (CLEOCIN T) 1 % lotion Not Taking Self Yes No   Sig: APPLY TO ACNE AREAS ON FACE TWICE A DAY   Patient not taking: Reported on 2022   cromolyn (NASALCHROM) 5.2 MG/ACT nasal spray Not Taking  No No   Si spray into each nostril 4 (four) times a day as needed for rhinitis   Patient not taking: Reported on 2025   ferrous sulfate 325 (65 Fe) mg tablet Not Taking Self Yes No   Sig: Take 325 mg by mouth daily with breakfast   Patient not taking: Reported on 2025   fluconazole (DIFLUCAN) 100 mg tablet Not Taking Self Yes No   Sig: Take 100 mg by mouth if needed   Patient not taking: Reported on 2023   fluticasone (FLONASE) 50 mcg/act nasal spray Not Taking Self No No   Si spray into each nostril daily   Patient not taking: Reported on 2022   triamcinolone (KENALOG) 0.1 % cream Not Taking Self Yes No   Sig: APPLY A SMALL AMOUNT TO RASH ON FACE TWICE DAILY FOR UP TO 2 WEEKS THEN AS NEEDED FOR FLARE UPS   Patient not taking: Reported on 2022   valACYclovir (VALTREX) 1,000 mg tablet   No No   Sig: Take 1 tablet (1,000 mg total) by mouth 2 (two) times a day for 10 days   Patient not taking: Reported on 2022      Facility-Administered Medications: None     Discharge Medication List as of 2025  4:49 PM        START  taking these medications    Details   metroNIDAZOLE (FLAGYL) 500 mg tablet Take 1 tablet (500 mg total) by mouth 2 (two) times a day for 7 days, Starting Sat 1/11/2025, Until Sat 1/18/2025, Normal           CONTINUE these medications which have NOT CHANGED    Details   3 Day Vaginal 2 % vaginal cream insert 1 applicatorful vaginally at bedtime for 3 days, Historical Med      acetaminophen (TYLENOL) 500 mg tablet Take 500 mg by mouth every 6 (six) hours as needed for mild pain, Historical Med      aspirin (ECOTRIN LOW STRENGTH) 81 mg EC tablet Take 2 tablets (162 mg total) by mouth daily Stop at 36 weeks., Starting Tue 12/20/2022, Until Mon 3/20/2023, Normal      Biotin 1 MG CAPS Take by mouth, Historical Med      Cholecalciferol 59192 units capsule Take 1 capsule (50,000 Units total) by mouth once a week x12wks and then to take OTC Vit D 2000IU QD, Starting Wed 10/23/2019, Normal      clindamycin (CLEOCIN T) 1 % lotion APPLY TO ACNE AREAS ON FACE TWICE A DAY, Historical Med      cromolyn (NASALCHROM) 5.2 MG/ACT nasal spray 1 spray into each nostril 4 (four) times a day as needed for rhinitis, Starting Wed 10/16/2024, Normal      ferrous sulfate 325 (65 Fe) mg tablet Take 325 mg by mouth daily with breakfast, Historical Med      fluconazole (DIFLUCAN) 100 mg tablet Take 100 mg by mouth if needed, Historical Med      fluticasone (FLONASE) 50 mcg/act nasal spray 1 spray into each nostril daily, Starting Thu 8/20/2020, Normal      Prenatal MV-Min-Fe Fum-FA-DHA (PRENATAL 1 PO) Take by mouth, Historical Med      triamcinolone (KENALOG) 0.1 % cream APPLY A SMALL AMOUNT TO RASH ON FACE TWICE DAILY FOR UP TO 2 WEEKS THEN AS NEEDED FOR FLARE UPS, Historical Med      valACYclovir (VALTREX) 1,000 mg tablet Take 1 tablet (1,000 mg total) by mouth 2 (two) times a day for 10 days, Starting Thu 5/26/2022, Until Sun 6/5/2022, Normal           No discharge procedures on file.  ED SEPSIS DOCUMENTATION   Time reflects when diagnosis  was documented in both MDM as applicable and the Disposition within this note       Time User Action Codes Description Comment    1/11/2025  4:48 PM Cheo Johnston Add [N76.0,  B96.89] BV (bacterial vaginosis)                  Cheo Johnston DO  01/11/25 4566

## 2025-01-13 LAB
C TRACH DNA SPEC QL NAA+PROBE: NEGATIVE
M GENITALIUM DNA SPEC QL NAA+PROBE: NEGATIVE
N GONORRHOEA DNA SPEC QL NAA+PROBE: NEGATIVE
T VAGINALIS DNA SPEC QL NAA+PROBE: NEGATIVE

## 2025-02-04 ENCOUNTER — HOSPITAL ENCOUNTER (EMERGENCY)
Facility: HOSPITAL | Age: 25
Discharge: HOME | End: 2025-02-04
Attending: STUDENT IN AN ORGANIZED HEALTH CARE EDUCATION/TRAINING PROGRAM
Payer: COMMERCIAL

## 2025-02-04 VITALS
TEMPERATURE: 98.6 F | OXYGEN SATURATION: 96 % | SYSTOLIC BLOOD PRESSURE: 125 MMHG | HEART RATE: 98 BPM | RESPIRATION RATE: 18 BRPM | DIASTOLIC BLOOD PRESSURE: 89 MMHG

## 2025-02-04 DIAGNOSIS — R68.89 FLU-LIKE SYMPTOMS: Primary | ICD-10-CM

## 2025-02-04 LAB
FLUAV AG UPPER RESP QL IA.RAPID: NEGATIVE
FLUBV AG UPPER RESP QL IA.RAPID: NEGATIVE
SARS-COV+SARS-COV-2 AG RESP QL IA.RAPID: NEGATIVE

## 2025-02-04 PROCEDURE — 87804 INFLUENZA ASSAY W/OPTIC: CPT | Performed by: STUDENT IN AN ORGANIZED HEALTH CARE EDUCATION/TRAINING PROGRAM

## 2025-02-04 PROCEDURE — 99283 EMERGENCY DEPT VISIT LOW MDM: CPT | Performed by: STUDENT IN AN ORGANIZED HEALTH CARE EDUCATION/TRAINING PROGRAM

## 2025-02-04 PROCEDURE — 99283 EMERGENCY DEPT VISIT LOW MDM: CPT

## 2025-02-04 PROCEDURE — 87811 SARS-COV-2 COVID19 W/OPTIC: CPT | Performed by: STUDENT IN AN ORGANIZED HEALTH CARE EDUCATION/TRAINING PROGRAM

## 2025-02-04 RX ORDER — ACETAMINOPHEN 325 MG/1
650 TABLET ORAL ONCE
Status: COMPLETED | OUTPATIENT
Start: 2025-02-04 | End: 2025-02-04

## 2025-02-04 RX ADMIN — ACETAMINOPHEN 650 MG: 325 TABLET ORAL at 09:07

## 2025-02-04 NOTE — Clinical Note
Lilian Moore was seen and treated in our emergency department on 2/4/2025.    No restrictions            Diagnosis:     Lilian  may return to work on return date.    She may return on this date: 02/06/2025         If you have any questions or concerns, please don't hesitate to call.      Mumtaz Bolden, DO    ______________________________           _______________          _______________  Hospital Representative                              Date                                Time

## 2025-02-04 NOTE — DISCHARGE INSTRUCTIONS
"You were evaluated in the Emergency Department today for your symptoms. Your evaluation suggests that your symptoms are most likely due to a viral illness, which will improve on its own with rest and fluids.    You may take ibuprofen every 6 hours or tylenol every 6 hours as needed for fever.    Please follow up with your primary care physician as soon as possible. If you do not have a primary doctor, you can call \"[facility]\" to schedule an appointment with one.     Return to the Emergency Department if you experience worsening cough, fever 100.4 ° F or greater not controlled by Tylenol or Ibuprofen, recurrent vomiting, chest pain, shortness of breath, or any other concerning symptoms  "

## 2025-02-04 NOTE — ED PROVIDER NOTES
Time reflects when diagnosis was documented in both MDM as applicable and the Disposition within this note       Time User Action Codes Description Comment    2/4/2025  9:20 AM Mumtaz Bolden Add [R68.89] Flu-like symptoms           ED Disposition       ED Disposition   Discharge    Condition   Stable    Date/Time   Tue Feb 4, 2025  8:58 AM    Comment   Lilian Moore discharge to home/self care.                   Assessment & Plan       Medical Decision Making  Patient is a 24 y.o. female who presents to the ED for viral symptoms.  Patient is nontoxic, well-appearing..    Patient's symptoms are suspicious for a likely viral upper respiratory infection. Differential includes sinusitis, allergic rhinitis. Do not suspect underlying cardiopulmonary process.    Plan: Viral testing, symptomatic treatment, discharge with return precautions        Amount and/or Complexity of Data Reviewed  Labs: ordered.    Risk  OTC drugs.             Medications   acetaminophen (TYLENOL) tablet 650 mg (650 mg Oral Given 2/4/25 0907)       ED Risk Strat Scores                          SBIRT 20yo+      Flowsheet Row Most Recent Value   Initial Alcohol Screen: US AUDIT-C     1. How often do you have a drink containing alcohol? 0 Filed at: 02/04/2025 0855   2. How many drinks containing alcohol do you have on a typical day you are drinking?  0 Filed at: 02/04/2025 0855   3a. Male UNDER 65: How often do you have five or more drinks on one occasion? 0 Filed at: 02/04/2025 0855   3b. FEMALE Any Age, or MALE 65+: How often do you have 4 or more drinks on one occassion? 0 Filed at: 02/04/2025 0855   Audit-C Score 0 Filed at: 02/04/2025 0855   RENE: How many times in the past year have you...    Used an illegal drug or used a prescription medication for non-medical reasons? Never Filed at: 02/04/2025 0855                            History of Present Illness       Chief Complaint   Patient presents with    Flu Symptoms     Pt reports daughter has  the flu and wants to get tested. Pt c/o body aches, nausea, cough, dec appetite starting Friday.       Past Medical History:   Diagnosis Date    Acne     Allergic     Asthma     Migraines       Past Surgical History:   Procedure Laterality Date    INDUCED         Family History   Problem Relation Age of Onset    Depression Mother     Anxiety disorder Mother     Bipolar disorder Mother     Diabetes Mother     Hypertension Mother     Hypertension Father     Sleep apnea Father     Pancreatic cancer Maternal Grandmother     Diabetes Maternal Grandmother     Hypertension Maternal Grandmother     Diabetes Maternal Grandfather     Pancreatic cancer Maternal Grandfather     Diabetes Paternal Grandmother     Diabetes Paternal Grandfather     Diabetes Maternal Aunt     Diabetes Maternal Uncle     Diabetes Paternal Aunt     Diabetes Paternal Uncle     No Known Problems Half-Sister     No Known Problems Half-Sister     No Known Problems Half-Sister     No Known Problems Half-Sister     No Known Problems Half-Brother     No Known Problems Half-Brother       Social History     Tobacco Use    Smoking status: Former     Current packs/day: 0.00     Types: Cigarettes     Quit date:      Years since quitting: 3.0    Smokeless tobacco: Never    Tobacco comments:     quit    Vaping Use    Vaping status: Never Used   Substance Use Topics    Alcohol use: Not Currently     Comment: social    Drug use: Not Currently     Frequency: 3.0 times per week     Types: Marijuana      E-Cigarette/Vaping    E-Cigarette Use Never User       E-Cigarette/Vaping Substances    Nicotine No     THC No     CBD No     Flavoring No     Other No     Unknown No       I have reviewed and agree with the history as documented.     24-year-old female who presents to the emergency room for flulike symptoms.  Patient states since Friday she has had bodyaches, nausea, cough, decreased appetite.  Has been taking Mucinex and ibuprofen with temporary  relief.  No other modifying factors.  No other associated symptoms.  Reports 2 sick contacts that have tested positive for the flu.  No other complaints or concerns.      Flu Symptoms  Presenting symptoms: cough, fever and nausea    Presenting symptoms: no shortness of breath        Review of Systems   Constitutional:  Positive for appetite change and fever.   Respiratory:  Positive for cough. Negative for shortness of breath.    Gastrointestinal:  Positive for nausea.   All other systems reviewed and are negative.          Objective       ED Triage Vitals [02/04/25 0853]   Temperature Pulse Blood Pressure Respirations SpO2 Patient Position - Orthostatic VS   98.6 °F (37 °C) 98 125/89 18 96 % --      Temp Source Heart Rate Source BP Location FiO2 (%) Pain Score    Oral Monitor Left arm -- --      Vitals      Date and Time Temp Pulse SpO2 Resp BP Pain Score FACES Pain Rating User   02/04/25 0853 98.6 °F (37 °C) 98 96 % 18 125/89 -- -- CS            Physical Exam  Vitals and nursing note reviewed.   Constitutional:       General: She is not in acute distress.     Appearance: She is well-developed. She is not ill-appearing, toxic-appearing or diaphoretic.   HENT:      Head: Normocephalic and atraumatic.      Right Ear: External ear normal.      Left Ear: External ear normal.      Nose: Nose normal.      Mouth/Throat:      Mouth: Mucous membranes are moist.      Pharynx: Oropharynx is clear. No oropharyngeal exudate or posterior oropharyngeal erythema.   Eyes:      General: Lids are normal. No scleral icterus.  Cardiovascular:      Rate and Rhythm: Normal rate and regular rhythm.      Heart sounds: Normal heart sounds. No murmur heard.     No friction rub. No gallop.   Pulmonary:      Effort: Pulmonary effort is normal. No respiratory distress.      Breath sounds: Normal breath sounds. No wheezing or rales.   Musculoskeletal:         General: No deformity. Normal range of motion.      Cervical back: Normal range of  motion and neck supple.   Skin:     General: Skin is warm and dry.   Neurological:      General: No focal deficit present.      Mental Status: She is alert.   Psychiatric:         Mood and Affect: Mood normal.         Behavior: Behavior normal.         Results Reviewed       Procedure Component Value Units Date/Time    FLU/COVID Rapid Antigen (30 min. TAT) - Preferred screening test in ED [[id number]]  (Normal) Collected: 02/04/25 0856    Lab Status: Final result Specimen: Nares from Nose Updated: 02/04/25 0918     SARS COV Rapid Antigen Negative     Influenza A Rapid Antigen Negative     Influenza B Rapid Antigen Negative    Narrative:      This test has been performed using the HIGH MOBILITY [employer] 2 FLU+SARS Antigen test under the Emergency Use Authorization (EUA). This test has been validated by the  and verified by the performing laboratory. The [employer] uses lateral flow immunofluorescent sandwich assay to detect SARS-COV, Influenza A and Influenza B Antigen.     The [employer] 2 SARS Antigen test does not differentiate between SARS-CoV and SARS-CoV-2.     Negative results are presumptive and may be confirmed with a molecular assay, if necessary, for patient management. Negative results do not rule out SARS-CoV-2 or influenza infection and should not be used as the sole basis for treatment or patient management decisions. A negative test result may occur if the level of antigen in a sample is below the limit of detection of this test.     Positive results are indicative of the presence of viral antigens, but do not rule out bacterial infection or co-infection with other viruses.     All test results should be used as an adjunct to clinical observations and other information available to the provider.    FOR PEDIATRIC PATIENTS - copy/paste COVID Guidelines URL to browser: [email or web address]            No orders to display       Procedures    ED  Medication and Procedure Management   Prior to Admission Medications   Prescriptions Last Dose Informant Patient Reported? Taking?   3 Day Vaginal 2 % vaginal cream  Self Yes No   Sig: insert 1 applicatorful vaginally at bedtime for 3 days   Patient not taking: Reported on 2023   Biotin 1 MG CAPS  Self Yes No   Sig: Take by mouth   Patient not taking: Reported on 2022   Cholecalciferol 30700 units capsule  Self No No   Sig: Take 1 capsule (50,000 Units total) by mouth once a week x12wks and then to take OTC Vit D 2000IU QD   Patient not taking: Reported on 2020   Prenatal MV-Min-Fe Fum-FA-DHA (PRENATAL 1 PO)  Self Yes No   Sig: Take by mouth   Patient not taking: Reported on 2024   acetaminophen (TYLENOL) 500 mg tablet  Self Yes No   Sig: Take 500 mg by mouth every 6 (six) hours as needed for mild pain   Patient not taking: Reported on 2023   aspirin (ECOTRIN LOW STRENGTH) 81 mg EC tablet   No No   Sig: Take 2 tablets (162 mg total) by mouth daily Stop at 36 weeks.   Patient not taking: Reported on 2023   clindamycin (CLEOCIN T) 1 % lotion  Self Yes No   Sig: APPLY TO ACNE AREAS ON FACE TWICE A DAY   Patient not taking: Reported on 2022   cromolyn (NASALCHROM) 5.2 MG/ACT nasal spray   No No   Si spray into each nostril 4 (four) times a day as needed for rhinitis   Patient not taking: Reported on 2025   ferrous sulfate 325 (65 Fe) mg tablet  Self Yes No   Sig: Take 325 mg by mouth daily with breakfast   Patient not taking: Reported on 2025   fluconazole (DIFLUCAN) 100 mg tablet  Self Yes No   Sig: Take 100 mg by mouth if needed   Patient not taking: Reported on 2023   fluticasone (FLONASE) 50 mcg/act nasal spray  Self No No   Si spray into each nostril daily   Patient not taking: Reported on 2022   triamcinolone (KENALOG) 0.1 % cream  Self Yes No   Sig: APPLY A SMALL AMOUNT TO RASH ON FACE TWICE DAILY FOR UP TO 2 WEEKS THEN AS NEEDED FOR FLARE UPS    Patient not taking: Reported on 8/17/2022   valACYclovir (VALTREX) 1,000 mg tablet   No No   Sig: Take 1 tablet (1,000 mg total) by mouth 2 (two) times a day for 10 days   Patient not taking: Reported on 12/20/2022      Facility-Administered Medications: None     Discharge Medication List as of 2/4/2025  9:26 AM        CONTINUE these medications which have NOT CHANGED    Details   3 Day Vaginal 2 % vaginal cream insert 1 applicatorful vaginally at bedtime for 3 days, Historical Med      acetaminophen (TYLENOL) 500 mg tablet Take 500 mg by mouth every 6 (six) hours as needed for mild pain, Historical Med      aspirin (ECOTRIN LOW STRENGTH) 81 mg EC tablet Take 2 tablets (162 mg total) by mouth daily Stop at 36 weeks., Starting Tue 12/20/2022, Until Mon 3/20/2023, Normal      Biotin 1 MG CAPS Take by mouth, Historical Med      Cholecalciferol 08702 units capsule Take 1 capsule (50,000 Units total) by mouth once a week x12wks and then to take OTC Vit D 2000IU QD, Starting Wed 10/23/2019, Normal      clindamycin (CLEOCIN T) 1 % lotion APPLY TO ACNE AREAS ON FACE TWICE A DAY, Historical Med      cromolyn (NASALCHROM) 5.2 MG/ACT nasal spray 1 spray into each nostril 4 (four) times a day as needed for rhinitis, Starting Wed 10/16/2024, Normal      ferrous sulfate 325 (65 Fe) mg tablet Take 325 mg by mouth daily with breakfast, Historical Med      fluconazole (DIFLUCAN) 100 mg tablet Take 100 mg by mouth if needed, Historical Med      fluticasone (FLONASE) 50 mcg/act nasal spray 1 spray into each nostril daily, Starting Thu 8/20/2020, Normal      Prenatal MV-Min-Fe Fum-FA-DHA (PRENATAL 1 PO) Take by mouth, Historical Med      triamcinolone (KENALOG) 0.1 % cream APPLY A SMALL AMOUNT TO RASH ON FACE TWICE DAILY FOR UP TO 2 WEEKS THEN AS NEEDED FOR FLARE UPS, Historical Med      valACYclovir (VALTREX) 1,000 mg tablet Take 1 tablet (1,000 mg total) by mouth 2 (two) times a day for 10 days, Starting Thu 5/26/2022, Until Sun  6/5/2022, Normal           No discharge procedures on file.  ED SEPSIS DOCUMENTATION   Time reflects when diagnosis was documented in both MDM as applicable and the Disposition within this note       Time User Action Codes Description Comment    2/4/2025  9:20 AM Mumtaz Bolden Add [R68.89] Flu-like symptoms                  Mumtaz Bolden, DO  02/04/25 9038

## 2025-04-01 ENCOUNTER — HOSPITAL ENCOUNTER (EMERGENCY)
Facility: HOSPITAL | Age: 25
Discharge: HOME | End: 2025-04-01
Attending: EMERGENCY MEDICINE
Payer: MEDICARE

## 2025-04-01 VITALS
RESPIRATION RATE: 18 BRPM | TEMPERATURE: 98.9 F | HEART RATE: 99 BPM | OXYGEN SATURATION: 100 % | DIASTOLIC BLOOD PRESSURE: 95 MMHG | SYSTOLIC BLOOD PRESSURE: 144 MMHG

## 2025-04-01 DIAGNOSIS — A60.09 HERPES GENITALIS IN WOMEN: ICD-10-CM

## 2025-04-01 DIAGNOSIS — R30.0 DYSURIA: Primary | ICD-10-CM

## 2025-04-01 LAB
BILIRUB UR QL STRIP: NEGATIVE
CLARITY UR: CLEAR
COLOR UR: YELLOW
EXT PREGNANCY TEST URINE: NEGATIVE
EXT. CONTROL: NORMAL
GLUCOSE UR STRIP-MCNC: NEGATIVE MG/DL
HGB UR QL STRIP.AUTO: NEGATIVE
KETONES UR STRIP-MCNC: NEGATIVE MG/DL
LEUKOCYTE ESTERASE UR QL STRIP: NEGATIVE
NITRITE UR QL STRIP: NEGATIVE
PH UR STRIP.AUTO: 6 [PH]
PROT UR STRIP-MCNC: NEGATIVE MG/DL
SP GR UR STRIP.AUTO: >=1.03 (ref 1–1.03)
UROBILINOGEN UR QL STRIP.AUTO: 0.2 E.U./DL

## 2025-04-01 PROCEDURE — 99284 EMERGENCY DEPT VISIT MOD MDM: CPT | Performed by: EMERGENCY MEDICINE

## 2025-04-01 PROCEDURE — 81025 URINE PREGNANCY TEST: CPT | Performed by: EMERGENCY MEDICINE

## 2025-04-01 PROCEDURE — 99283 EMERGENCY DEPT VISIT LOW MDM: CPT

## 2025-04-01 PROCEDURE — 81003 URINALYSIS AUTO W/O SCOPE: CPT | Performed by: EMERGENCY MEDICINE

## 2025-04-01 RX ORDER — VALACYCLOVIR HYDROCHLORIDE 1 G/1
1000 TABLET, FILM COATED ORAL 2 TIMES DAILY
Qty: 20 TABLET | Refills: 0 | Status: SHIPPED | OUTPATIENT
Start: 2025-04-01 | End: 2025-04-11

## 2025-04-01 NOTE — ED NOTES
Discharge instructions reviewed with pt. Pt verbalized understanding. And has no further questions at this time. Pt ambulatory off unit with steady gait.      Neisha Ellis RN  04/01/25 0753

## 2025-04-01 NOTE — ED PROVIDER NOTES
ED Disposition       None          Assessment & Plan       Medical Decision Making  24-year-old female presenting with dysuria and urinary frequency since yesterday.  Patient states this is very similar to her prior UTIs in the past.  On exam, she is well-appearing nontoxic.  She has no abdominal or flank pain to suggest pyelonephritis.  She denies discharge or fevers.  Will obtain urinalysis and pregnancy status    Problems Addressed:  Dysuria: acute illness or injury     Details: Urinalysis negative.  Potentially in the setting of recurrent genital herpes.  Will restart valacyclovir    Amount and/or Complexity of Data Reviewed  Labs: ordered.    Risk  Prescription drug management.        ED Course as of 25 1045   Tue 2025   1040 Urinalysis negative for infection.  Patient is starting to wonder if this is a recurrence of her genital herpes.  She states friction from recent protected intercourse caused irritation in her vagina.  Patient is requesting refill of her valacyclovir       Medications - No data to display    ED Risk Strat Scores                                                History of Present Illness       No chief complaint on file.      Past Medical History:   Diagnosis Date    Acne     Allergic     Asthma     Migraines       Past Surgical History:   Procedure Laterality Date    INDUCED         Family History   Problem Relation Age of Onset    Depression Mother     Anxiety disorder Mother     Bipolar disorder Mother     Diabetes Mother     Hypertension Mother     Hypertension Father     Sleep apnea Father     Pancreatic cancer Maternal Grandmother     Diabetes Maternal Grandmother     Hypertension Maternal Grandmother     Diabetes Maternal Grandfather     Pancreatic cancer Maternal Grandfather     Diabetes Paternal Grandmother     Diabetes Paternal Grandfather     Diabetes Maternal Aunt     Diabetes Maternal Uncle     Diabetes Paternal Aunt     Diabetes Paternal Uncle     No  Known Problems Half-Sister     No Known Problems Half-Sister     No Known Problems Half-Sister     No Known Problems Half-Sister     No Known Problems Half-Brother     No Known Problems Half-Brother       Social History     Tobacco Use    Smoking status: Former     Current packs/day: 0.00     Types: Cigarettes     Quit date: 2022     Years since quitting: 3.2    Smokeless tobacco: Never    Tobacco comments:     quit 2018   Vaping Use    Vaping status: Never Used   Substance Use Topics    Alcohol use: Not Currently     Comment: social    Drug use: Not Currently     Frequency: 3.0 times per week     Types: Marijuana      E-Cigarette/Vaping    E-Cigarette Use Never User       E-Cigarette/Vaping Substances    Nicotine No     THC No     CBD No     Flavoring No     Other No     Unknown No       I have reviewed and agree with the history as documented.     24-year-old female presenting with dysuria and urinary frequency since yesterday.  Patient states this feels like prior UTI symptoms.  She does states she called her PCP office but they do not have an appointment for several weeks.  She denies abdominal pain or back pain or fevers or discharge        Review of Systems   Constitutional:  Negative for fever.   Gastrointestinal:  Positive for nausea. Negative for abdominal pain.   Genitourinary:  Positive for dysuria, frequency and urgency. Negative for flank pain.   Musculoskeletal:  Negative for back pain.           Objective       ED Triage Vitals   Temp Pulse BP Resp SpO2 Patient Position - Orthostatic VS   -- -- -- -- -- --      Temp src Heart Rate Source BP Location FiO2 (%) Pain Score    -- -- -- -- --      Vitals    None         Physical Exam  Vitals and nursing note reviewed.   Constitutional:       General: She is not in acute distress.  HENT:      Head: Normocephalic and atraumatic.   Eyes:      Conjunctiva/sclera: Conjunctivae normal.   Pulmonary:      Effort: Pulmonary effort is normal. No respiratory distress.    Abdominal:      General: There is no distension.      Tenderness: There is no abdominal tenderness.   Skin:     General: Skin is warm and dry.   Neurological:      Mental Status: She is alert. Mental status is at baseline.   Psychiatric:         Mood and Affect: Mood normal.         Results Reviewed       Procedure Component Value Units Date/Time    UA w Reflex to Microscopic w Reflex to Culture [[id number]]     Lab Status: No result Specimen: Urine     POCT pregnancy, urine [[id number]]     Lab Status: No result             No orders to display       Procedures    ED Medication and Procedure Management   Prior to Admission Medications   Prescriptions Last Dose Informant Patient Reported? Taking?   3 Day Vaginal 2 % vaginal cream  Self Yes No   Sig: insert 1 applicatorful vaginally at bedtime for 3 days   Patient not taking: Reported on 2023   Biotin 1 MG CAPS  Self Yes No   Sig: Take by mouth   Patient not taking: Reported on 2022   Cholecalciferol 08746 units capsule  Self No No   Sig: Take 1 capsule (50,000 Units total) by mouth once a week x12wks and then to take OTC Vit D 2000IU QD   Patient not taking: Reported on 2020   Prenatal MV-Min-Fe Fum-FA-DHA (PRENATAL 1 PO)  Self Yes No   Sig: Take by mouth   Patient not taking: Reported on 2024   acetaminophen (TYLENOL) 500 mg tablet  Self Yes No   Sig: Take 500 mg by mouth every 6 (six) hours as needed for mild pain   Patient not taking: Reported on 2023   aspirin (ECOTRIN LOW STRENGTH) 81 mg EC tablet   No No   Sig: Take 2 tablets (162 mg total) by mouth daily Stop at 36 weeks.   Patient not taking: Reported on 2023   clindamycin (CLEOCIN T) 1 % lotion  Self Yes No   Sig: APPLY TO ACNE AREAS ON FACE TWICE A DAY   Patient not taking: Reported on 2022   cromolyn (NASALCHROM) 5.2 MG/ACT nasal spray   No No   Si spray into each nostril 4 (four) times a day as needed for rhinitis   Patient not taking: Reported on 2025   ferrous  sulfate 325 (65 Fe) mg tablet  Self Yes No   Sig: Take 325 mg by mouth daily with breakfast   Patient not taking: Reported on 2025   fluconazole (DIFLUCAN) 100 mg tablet  Self Yes No   Sig: Take 100 mg by mouth if needed   Patient not taking: Reported on 2023   fluticasone (FLONASE) 50 mcg/act nasal spray  Self No No   Si spray into each nostril daily   Patient not taking: Reported on 2022   triamcinolone (KENALOG) 0.1 % cream  Self Yes No   Sig: APPLY A SMALL AMOUNT TO RASH ON FACE TWICE DAILY FOR UP TO 2 WEEKS THEN AS NEEDED FOR FLARE UPS   Patient not taking: Reported on 2022   valACYclovir (VALTREX) 1,000 mg tablet   No No   Sig: Take 1 tablet (1,000 mg total) by mouth 2 (two) times a day for 10 days   Patient not taking: Reported on 2022      Facility-Administered Medications: None     Patient's Medications   Discharge Prescriptions    No medications on file     No discharge procedures on file.  ED SEPSIS DOCUMENTATION            Shane Casas DO  [phone]

## 2025-04-05 ENCOUNTER — HOSPITAL ENCOUNTER (EMERGENCY)
Facility: HOSPITAL | Age: 25
Discharge: HOME | End: 2025-04-05
Attending: EMERGENCY MEDICINE
Payer: MEDICARE

## 2025-04-05 VITALS
TEMPERATURE: 98.4 F | RESPIRATION RATE: 18 BRPM | HEART RATE: 95 BPM | OXYGEN SATURATION: 98 % | SYSTOLIC BLOOD PRESSURE: 174 MMHG | DIASTOLIC BLOOD PRESSURE: 95 MMHG

## 2025-04-05 DIAGNOSIS — N89.8 VAGINAL IRRITATION: ICD-10-CM

## 2025-04-05 DIAGNOSIS — N89.8 VAGINAL DISCHARGE: ICD-10-CM

## 2025-04-05 DIAGNOSIS — R30.0 DYSURIA: Primary | ICD-10-CM

## 2025-04-05 LAB
BACTERIA UR QL AUTO: NORMAL /HPF
BILIRUB UR QL STRIP: NEGATIVE
CLARITY UR: ABNORMAL
COLOR UR: YELLOW
EXT PREGNANCY TEST URINE: NEGATIVE
EXT. CONTROL: NORMAL
GLUCOSE UR STRIP-MCNC: NEGATIVE MG/DL
HGB UR QL STRIP.AUTO: ABNORMAL
KETONES UR STRIP-MCNC: ABNORMAL MG/DL
LEUKOCYTE ESTERASE UR QL STRIP: ABNORMAL
NITRITE UR QL STRIP: NEGATIVE
NON-SQ EPI CELLS URNS QL MICRO: NORMAL /HPF
PH UR STRIP.AUTO: 6 [PH]
PROT UR STRIP-MCNC: NEGATIVE MG/DL
RBC #/AREA URNS AUTO: NORMAL /HPF
SP GR UR STRIP.AUTO: 1.02 (ref 1–1.03)
UROBILINOGEN UR QL STRIP.AUTO: 4 E.U./DL
WBC #/AREA URNS AUTO: NORMAL /HPF

## 2025-04-05 PROCEDURE — 87661 TRICHOMONAS VAGINALIS AMPLIF: CPT | Performed by: EMERGENCY MEDICINE

## 2025-04-05 PROCEDURE — 87491 CHLMYD TRACH DNA AMP PROBE: CPT | Performed by: EMERGENCY MEDICINE

## 2025-04-05 PROCEDURE — 87591 N.GONORRHOEAE DNA AMP PROB: CPT | Performed by: EMERGENCY MEDICINE

## 2025-04-05 PROCEDURE — 87563 M. GENITALIUM AMP PROBE: CPT | Performed by: EMERGENCY MEDICINE

## 2025-04-05 PROCEDURE — 81001 URINALYSIS AUTO W/SCOPE: CPT | Performed by: EMERGENCY MEDICINE

## 2025-04-05 PROCEDURE — 99284 EMERGENCY DEPT VISIT MOD MDM: CPT | Performed by: EMERGENCY MEDICINE

## 2025-04-05 PROCEDURE — 81514 NFCT DS BV&VAGINITIS DNA ALG: CPT | Performed by: EMERGENCY MEDICINE

## 2025-04-05 PROCEDURE — 96372 THER/PROPH/DIAG INJ SC/IM: CPT

## 2025-04-05 PROCEDURE — 81025 URINE PREGNANCY TEST: CPT | Performed by: EMERGENCY MEDICINE

## 2025-04-05 PROCEDURE — 99283 EMERGENCY DEPT VISIT LOW MDM: CPT

## 2025-04-05 RX ORDER — DOXYCYCLINE 100 MG/1
100 CAPSULE ORAL 2 TIMES DAILY
Qty: 14 CAPSULE | Refills: 0 | Status: SHIPPED | OUTPATIENT
Start: 2025-04-05 | End: 2025-04-12

## 2025-04-05 RX ORDER — DOXYCYCLINE 100 MG/1
100 CAPSULE ORAL ONCE
Status: COMPLETED | OUTPATIENT
Start: 2025-04-05 | End: 2025-04-05

## 2025-04-05 RX ORDER — FLUCONAZOLE 100 MG/1
200 TABLET ORAL ONCE
Status: COMPLETED | OUTPATIENT
Start: 2025-04-05 | End: 2025-04-05

## 2025-04-05 RX ORDER — FLUCONAZOLE 150 MG/1
150 TABLET ORAL ONCE
Qty: 1 TABLET | Refills: 0 | Status: SHIPPED | OUTPATIENT
Start: 2025-04-05 | End: 2025-04-05

## 2025-04-05 RX ORDER — DOXYCYCLINE 100 MG/1
100 CAPSULE ORAL 2 TIMES DAILY
Qty: 14 CAPSULE | Refills: 0 | Status: SHIPPED | OUTPATIENT
Start: 2025-04-05 | End: 2025-04-05

## 2025-04-05 RX ADMIN — FLUCONAZOLE 200 MG: 100 TABLET ORAL at 14:33

## 2025-04-05 RX ADMIN — DOXYCYCLINE HYCLATE 100 MG: 100 CAPSULE ORAL at 14:33

## 2025-04-05 RX ADMIN — LIDOCAINE HYDROCHLORIDE 500 MG: 10 INJECTION, SOLUTION EPIDURAL; INFILTRATION; INTRACAUDAL; PERINEURAL at 14:35

## 2025-04-05 NOTE — DISCHARGE INSTRUCTIONS
Take the prescribed medications as directed. You were prescribed an antibiotic, doxycycline, as well as a medication for a yeast infection, fluconazole. You were already given one dose of fluconazole in the ED. A second dose was sent to the pharmacy for this if you are still having symptoms on 4/8/25. If your symptoms have resolved you do not need to take the second dose. You should abstain from sexual activity until completion of the antibiotics or until your test results are negative. Please return to the emergency department if you develop worsening symptoms, abdominal pain, fever, or anything else concerning to you.

## 2025-04-05 NOTE — ED PROVIDER NOTES
Time reflects when diagnosis was documented in both MDM as applicable and the Disposition within this note       Time User Action Codes Description Comment    4/5/2025  1:14 PM AbrahanGlenna schwartz Add [N89.8] Vaginal irritation     4/5/2025  1:14 PM AbrahanGlenna schwartz Add [N89.8] Vaginal discharge     4/5/2025  2:17 PM AbrahanGlenna schwartz Add [R30.0] Dysuria     4/5/2025  2:17 PM AbrahanGlenna schwartz Modify [N89.8] Vaginal irritation     4/5/2025  2:17 PM AbrahanGlenna schwartz Modify [R30.0] Dysuria           ED Disposition       ED Disposition   Discharge    Condition   Stable    Date/Time   Sat Apr 5, 2025  2:17 PM    Comment   Lilian Moore discharge to home/self care.                   Assessment & Plan       Medical Decision Making  24-year-old female presenting for vaginal irritation, vaginal discharge.  Differential diagnoses include but not limited to UTI, pregnancy, vaginal candidiasis, STI, allergic reaction.  Will check UA and send STI testing.  No abnormalities or lesions noted on external vaginal examination. UA negative for UTI.  Patient is amenable to prophylactic treatment for STIs as well as fluconazole for candidiasis. Advised abstaining from sexual activity until completion of antibiotics. Advised follow up with PCP and OBGYN. Return precautions discussed.    Problems Addressed:  Dysuria: acute illness or injury  Vaginal discharge: acute illness or injury  Vaginal irritation: acute illness or injury    Amount and/or Complexity of Data Reviewed  Labs: ordered.    Risk  Prescription drug management.             Medications   cefTRIAXone (ROCEPHIN) 500 mg in lidocaine (PF) (XYLOCAINE-MPF) 1 % IM only syringe (has no administration in time range)   doxycycline hyclate (VIBRAMYCIN) capsule 100 mg (has no administration in time range)   fluconazole (DIFLUCAN) tablet 200 mg (has no administration in time range)       ED Risk Strat Scores                            SBIRT 22yo+      Flowsheet Row Most Recent Value   Initial Alcohol  Screen: US AUDIT-C     1. How often do you have a drink containing alcohol? 0 Filed at: 2025 1300   2. How many drinks containing alcohol do you have on a typical day you are drinking?  0 Filed at: 2025 1300   3b. FEMALE Any Age, or MALE 65+: How often do you have 4 or more drinks on one occassion? 0 Filed at: 2025 1300   Audit-C Score 0 Filed at: 2025 1300   RENE: How many times in the past year have you...    Used an illegal drug or used a prescription medication for non-medical reasons? Never Filed at: 2025 1300                            History of Present Illness       Chief Complaint   Patient presents with    Possible UTI     Pt  reports burning and itching in vaginal area that started  that have become worse. Pt recently seen here for same symptoms. Pt thinks she is having a reaction to the latex from the condom. No meds pta       Past Medical History:   Diagnosis Date    Acne     Allergic     Asthma     Migraines       Past Surgical History:   Procedure Laterality Date    INDUCED         Family History   Problem Relation Age of Onset    Depression Mother     Anxiety disorder Mother     Bipolar disorder Mother     Diabetes Mother     Hypertension Mother     Hypertension Father     Sleep apnea Father     Pancreatic cancer Maternal Grandmother     Diabetes Maternal Grandmother     Hypertension Maternal Grandmother     Diabetes Maternal Grandfather     Pancreatic cancer Maternal Grandfather     Diabetes Paternal Grandmother     Diabetes Paternal Grandfather     Diabetes Maternal Aunt     Diabetes Maternal Uncle     Diabetes Paternal Aunt     Diabetes Paternal Uncle     No Known Problems Half-Sister     No Known Problems Half-Sister     No Known Problems Half-Sister     No Known Problems Half-Sister     No Known Problems Half-Brother     No Known Problems Half-Brother       Social History     Tobacco Use    Smoking status: Every Day     Current packs/day: 1.00     Average  packs/day: 1 pack/day for 0.3 years (0.3 ttl pk-yrs)     Types: Cigarettes     Start date: 2025     Last attempt to quit: 2022    Smokeless tobacco: Never    Tobacco comments:     quit 2018   Vaping Use    Vaping status: Never Used   Substance Use Topics    Alcohol use: Not Currently     Comment: social    Drug use: Not Currently     Frequency: 3.0 times per week     Types: Marijuana      E-Cigarette/Vaping    E-Cigarette Use Never User       E-Cigarette/Vaping Substances    Nicotine No     THC No     CBD No     Flavoring No     Other No     Unknown No       I have reviewed and agree with the history as documented.     24-year-old female with no pertinent past medical history who presents for eval patient of vaginal itching and irritation.  Patient reports original onset approximately 6 days ago after having intercourse while using a latex condom.  She was evaluated 4 days ago in the emergency department at which time she thought she could be having a urinary tract infection or a herpes outbreak.  Her UA was negative for UTI at that time and she was reinitiated on her Valtrex.  She reports worsening symptoms since that time.  She has dysuria as well as generalized vaginal burning and itching.  She has noted some thick white discharge.  She denies any abdominal pain, fever, flank pain, vomiting.  She has been taking the previously prescribed Valtrex as directed.        Review of Systems   Constitutional:  Negative for chills and fever.   Respiratory:  Negative for shortness of breath.    Cardiovascular:  Negative for chest pain.   Gastrointestinal:  Negative for abdominal pain, nausea and vomiting.   Genitourinary:  Positive for dysuria and vaginal discharge. Negative for flank pain.   Musculoskeletal:  Negative for gait problem.   Skin:  Negative for rash.   Neurological:  Negative for weakness and light-headedness.   All other systems reviewed and are negative.          Objective       ED Triage Vitals [04/05/25  1257]   Temperature Pulse Blood Pressure Respirations SpO2 Patient Position - Orthostatic VS   98.4 °F (36.9 °C) 95 (!) 174/95 18 98 % Sitting      Temp Source Heart Rate Source BP Location FiO2 (%) Pain Score    Oral Monitor Left arm -- --      Vitals      Date and Time Temp Pulse SpO2 Resp BP Pain Score FACES Pain Rating User   04/05/25 1257 98.4 °F (36.9 °C) 95 98 % 18 174/95 -- -- AM            Physical Exam  Vitals and nursing note reviewed.   Constitutional:       General: She is not in acute distress.     Appearance: She is well-developed. She is not ill-appearing.   HENT:      Head: Normocephalic and atraumatic.      Nose: Nose normal.      Mouth/Throat:      Mouth: Mucous membranes are moist.   Eyes:      Conjunctiva/sclera: Conjunctivae normal.   Cardiovascular:      Rate and Rhythm: Normal rate and regular rhythm.      Heart sounds: No murmur heard.     No friction rub. No gallop.   Pulmonary:      Effort: Pulmonary effort is normal.      Breath sounds: Normal breath sounds. No wheezing, rhonchi or rales.   Abdominal:      General: There is no distension.      Palpations: Abdomen is soft.      Tenderness: There is no abdominal tenderness. There is no right CVA tenderness or left CVA tenderness.   Genitourinary:     General: Normal vulva.      Vagina: Vaginal discharge (scant white) present.      Comments: No lesions noted.  Musculoskeletal:         General: No swelling or tenderness. Normal range of motion.      Cervical back: Normal range of motion and neck supple.   Skin:     General: Skin is warm and dry.      Coloration: Skin is not pale.      Findings: No rash.   Neurological:      General: No focal deficit present.      Mental Status: She is alert and oriented to person, place, and time.   Psychiatric:         Behavior: Behavior normal.         Results Reviewed       Procedure Component Value Units Date/Time    Urine Microscopic [[id number]]  (Normal) Collected: 04/05/25 1329    Lab Status: Final  result Specimen: Urine, Clean Catch Updated: 04/05/25 1414     RBC, UA 2-4 /hpf      WBC, UA 1-2 /hpf      Epithelial Cells Occasional /hpf      Bacteria, UA Occasional /hpf     UA w Reflex to Microscopic w Reflex to Culture [[id number]]  (Abnormal) Collected: 04/05/25 1329    Lab Status: Final result Specimen: Urine, Clean Catch Updated: 04/05/25 1402     Color, UA Yellow     Clarity, UA Slightly Cloudy     Specific Gravity, UA 1.025     pH, UA 6.0     Leukocytes, UA Trace     Nitrite, UA Negative     Protein, UA Negative mg/dl      Glucose, UA Negative mg/dl      Ketones, UA Trace mg/dl      Urobilinogen, UA 4.0 E.U./dl      Bilirubin, UA Negative     Occult Blood, UA Trace-Intact    Trichomonas vaginalis/Mycoplasma genitalium PCR [[id number]] Collected: 04/05/25 1329    Lab Status: In process Specimen: Urine, Voided Updated: 04/05/25 1334    Molecular Vaginal Panel [[id number]] Collected: 04/05/25 1329    Lab Status: In process Specimen: Genital from Vaginal Updated: 04/05/25 1334    Chlamydia/GC amplified DNA by PCR [[id number]] Collected: 04/05/25 1329    Lab Status: In process Specimen: Urine, Other Updated: 04/05/25 1334    POCT pregnancy, urine [[id number]]  (Normal) Collected: 04/05/25 1324    Lab Status: Final result Updated: 04/05/25 1324     EXT Preg Test, Ur Negative     Control Valid            No orders to display       Procedures    ED Medication and Procedure Management   Prior to Admission Medications   Prescriptions Last Dose Informant Patient Reported? Taking?   3 Day Vaginal 2 % vaginal cream  Self Yes No   Sig: insert 1 applicatorful vaginally at bedtime for 3 days   Patient not taking: Reported on 2/9/2023   Biotin 1 MG CAPS  Self Yes No   Sig: Take by mouth   Patient not taking: Reported on 11/30/2022   Cholecalciferol 62179 units capsule  Self No No   Sig: Take 1 capsule (50,000 Units total) by mouth once a week x12wks and then to take OTC Vit D 2000IU QD   Patient not taking: Reported on  2020   Prenatal MV-Min-Fe Fum-FA-DHA (PRENATAL 1 PO)  Self Yes No   Sig: Take by mouth   Patient not taking: Reported on 2024   acetaminophen (TYLENOL) 500 mg tablet  Self Yes No   Sig: Take 500 mg by mouth every 6 (six) hours as needed for mild pain   Patient not taking: Reported on 2023   aspirin (ECOTRIN LOW STRENGTH) 81 mg EC tablet   No No   Sig: Take 2 tablets (162 mg total) by mouth daily Stop at 36 weeks.   Patient not taking: Reported on 2023   clindamycin (CLEOCIN T) 1 % lotion  Self Yes No   Sig: APPLY TO ACNE AREAS ON FACE TWICE A DAY   Patient not taking: Reported on 2022   cromolyn (NASALCHROM) 5.2 MG/ACT nasal spray   No No   Si spray into each nostril 4 (four) times a day as needed for rhinitis   Patient not taking: Reported on 2025   ferrous sulfate 325 (65 Fe) mg tablet  Self Yes No   Sig: Take 325 mg by mouth daily with breakfast   Patient not taking: Reported on 2025   fluconazole (DIFLUCAN) 100 mg tablet  Self Yes No   Sig: Take 100 mg by mouth if needed   Patient not taking: Reported on 2023   fluticasone (FLONASE) 50 mcg/act nasal spray  Self No No   Si spray into each nostril daily   Patient not taking: Reported on 2022   triamcinolone (KENALOG) 0.1 % cream  Self Yes No   Sig: APPLY A SMALL AMOUNT TO RASH ON FACE TWICE DAILY FOR UP TO 2 WEEKS THEN AS NEEDED FOR FLARE UPS   Patient not taking: Reported on 2022   valACYclovir (VALTREX) 1,000 mg tablet   No No   Sig: Take 1 tablet (1,000 mg total) by mouth 2 (two) times a day for 10 days      Facility-Administered Medications: None     Patient's Medications   Discharge Prescriptions    DOXYCYCLINE HYCLATE (VIBRAMYCIN) 100 MG CAPSULE    Take 1 capsule (100 mg total) by mouth 2 (two) times a day for 7 days       Start Date: 2025  End Date: 2025       Order Dose: 100 mg       Quantity: 14 capsule    Refills: 0    FLUCONAZOLE (DIFLUCAN) 150 MG TABLET    Take 1 tablet (150 mg total) by  mouth once for 1 dose Use on 4/8/25 if still having symptoms.       Start Date: 4/5/2025  End Date: 4/5/2025       Order Dose: 150 mg       Quantity: 1 tablet    Refills: 0     No discharge procedures on file.  ED SEPSIS DOCUMENTATION   Time reflects when diagnosis was documented in both MDM as applicable and the Disposition within this note       Time User Action Codes Description Comment    4/5/2025  1:14 PM Glenna Gauthier Add [N89.8] Vaginal irritation     4/5/2025  1:14 PM Glenna Gauthier Add [N89.8] Vaginal discharge     4/5/2025  2:17 PM Glenna Gauthier Add [R30.0] Dysuria     4/5/2025  2:17 PM Glenna Gauthier Modify [N89.8] Vaginal irritation     4/5/2025  2:17 PM Glenna Gauthier Modify [R30.0] Dysuria                  Glenna Gauthier MD  04/05/25 0307

## 2025-04-06 ENCOUNTER — RESULTS FOLLOW-UP (OUTPATIENT)
Dept: EMERGENCY DEPT | Facility: HOSPITAL | Age: 25
End: 2025-04-06

## 2025-04-06 DIAGNOSIS — B96.89 BV (BACTERIAL VAGINOSIS): Primary | ICD-10-CM

## 2025-04-06 DIAGNOSIS — N76.0 BV (BACTERIAL VAGINOSIS): Primary | ICD-10-CM

## 2025-04-06 LAB
C GLABRATA DNA VAG QL NAA+PROBE: NEGATIVE
C KRUSEI DNA VAG QL NAA+PROBE: NEGATIVE
CANDIDA SP 6 PNL VAG NAA+PROBE: POSITIVE
T VAGINALIS DNA VAG QL NAA+PROBE: NEGATIVE
VAGINOSIS/ITIS DNA PNL VAG PROBE+SIG AMP: POSITIVE

## 2025-04-06 RX ORDER — METRONIDAZOLE 500 MG/1
500 TABLET ORAL EVERY 12 HOURS SCHEDULED
Qty: 14 TABLET | Refills: 0 | Status: SHIPPED | OUTPATIENT
Start: 2025-04-06 | End: 2025-04-13
